# Patient Record
Sex: MALE | Race: WHITE | NOT HISPANIC OR LATINO | URBAN - METROPOLITAN AREA
[De-identification: names, ages, dates, MRNs, and addresses within clinical notes are randomized per-mention and may not be internally consistent; named-entity substitution may affect disease eponyms.]

---

## 2020-02-07 ENCOUNTER — ON CAMPUS - OUTPATIENT (AMBULATORY)
Dept: URBAN - METROPOLITAN AREA HOSPITAL 2 | Facility: HOSPITAL | Age: 65
End: 2020-02-07
Payer: COMMERCIAL

## 2020-02-07 VITALS
SYSTOLIC BLOOD PRESSURE: 126 MMHG | DIASTOLIC BLOOD PRESSURE: 86 MMHG | OXYGEN SATURATION: 99 % | SYSTOLIC BLOOD PRESSURE: 137 MMHG | HEART RATE: 72 BPM | DIASTOLIC BLOOD PRESSURE: 80 MMHG | TEMPERATURE: 98.6 F | DIASTOLIC BLOOD PRESSURE: 81 MMHG | DIASTOLIC BLOOD PRESSURE: 99 MMHG | HEART RATE: 78 BPM | DIASTOLIC BLOOD PRESSURE: 77 MMHG | DIASTOLIC BLOOD PRESSURE: 94 MMHG | DIASTOLIC BLOOD PRESSURE: 85 MMHG | SYSTOLIC BLOOD PRESSURE: 140 MMHG | SYSTOLIC BLOOD PRESSURE: 149 MMHG | SYSTOLIC BLOOD PRESSURE: 135 MMHG | HEIGHT: 73 IN | SYSTOLIC BLOOD PRESSURE: 136 MMHG | RESPIRATION RATE: 16 BRPM | OXYGEN SATURATION: 96 % | OXYGEN SATURATION: 95 % | HEART RATE: 82 BPM | OXYGEN SATURATION: 100 % | HEART RATE: 79 BPM | RESPIRATION RATE: 18 BRPM | SYSTOLIC BLOOD PRESSURE: 130 MMHG | HEART RATE: 73 BPM | HEART RATE: 75 BPM | OXYGEN SATURATION: 97 % | DIASTOLIC BLOOD PRESSURE: 83 MMHG | WEIGHT: 238 LBS | SYSTOLIC BLOOD PRESSURE: 147 MMHG

## 2020-02-07 DIAGNOSIS — K64.1 SECOND DEGREE HEMORRHOIDS: ICD-10-CM

## 2020-02-07 DIAGNOSIS — Z12.11 ENCOUNTER FOR SCREENING FOR MALIGNANT NEOPLASM OF COLON: ICD-10-CM

## 2020-02-07 PROCEDURE — 45378 DIAGNOSTIC COLONOSCOPY: CPT | Mod: 33 | Performed by: INTERNAL MEDICINE

## 2021-07-21 ENCOUNTER — TELEPHONE (OUTPATIENT)
Dept: CARDIOLOGY | Facility: CLINIC | Age: 66
End: 2021-07-21

## 2021-07-21 PROBLEM — R55 SYNCOPE AND COLLAPSE: Status: ACTIVE | Noted: 2021-07-21

## 2021-07-21 RX ORDER — ESCITALOPRAM OXALATE 10 MG/1
10 TABLET ORAL DAILY
COMMUNITY

## 2021-07-21 RX ORDER — ROSUVASTATIN CALCIUM 10 MG/1
10 TABLET, COATED ORAL DAILY
COMMUNITY

## 2021-07-21 RX ORDER — HYDROCODONE BITARTRATE AND ACETAMINOPHEN 10; 325 MG/1; MG/1
1 TABLET ORAL EVERY 6 HOURS PRN
COMMUNITY

## 2021-07-21 RX ORDER — CELECOXIB 100 MG/1
100 CAPSULE ORAL EVERY OTHER DAY
COMMUNITY

## 2021-07-21 RX ORDER — LOSARTAN POTASSIUM 100 MG/1
100 TABLET ORAL DAILY
COMMUNITY

## 2021-07-21 NOTE — PROGRESS NOTES
Reason for consultation: Syncope     Referring physician: self    HPI. Caleb Moreno is a 65 year old male who presents to the office with concerns of dizziness and syncope. His wife reports he has had these episodes for many years and previous work up has found no cause. Because his wife had a pacemaker placed recently she is concerned about his heart and thought he needed to be re-evaluated.  He reports it happens about every 10 years or so and he has usually related to being dehydrated. He admits to not drinking water very often. He states the next day he is tired but denies any brain fog or seizure type activity. Previous work up was a stress test and holter monitor 10 years or so ago, which came back normal. He does have high blood pressure, and has had a sleep study in the past that was borderline for sleep apnea by his report. He denies history of murmur, CAD, congestive heart failure, valvular heart disease, history of atrial fibrillation or other dysrhythmias.     Mr. Moreno describes himself is very functionally active.  He enjoys all sorts of outdoor projects and works out a couple of times per week and works 40+ hours per week at his regular job, which involves a fair amount of walking.    His brother a triple bypass 3 months ago, and his sister had a double bypass 2 months ago.    Current medication list was reviewed in detail today.     Past Medical History:   Diagnosis Date   • Back pain    • Depression    • History of meningitis    • HLD (hyperlipidemia)    • HTN (hypertension)    • Swelling of both lower extremities    • Syncope    • Vertigo        Past Surgical History:   Procedure Laterality Date   • KNEE SURGERY         Family History   Problem Relation Age of Onset   • Cancer Mother    • Heart disease Father    • Hypertension Brother        Social History     Socioeconomic History   • Marital status:      Spouse name: Not on file   • Number of children: Not on file   • Years of education: Not  "on file   • Highest education level: Not on file   Tobacco Use   • Smoking status: Former Smoker     Quit date:      Years since quittin.5   • Smokeless tobacco: Never Used   Vaping Use   • Vaping Use: Never used   Substance and Sexual Activity   • Alcohol use: Not Currently   • Drug use: Yes     Frequency: 1.0 times per week     Types: Marijuana   • Sexual activity: Defer         Review of Systems   General: denies fever, chills, anorexia, weight loss  Eyes: denies blurring, diplopia  Ear/Nose/Throat: denies ear pain, nosebleeds, hoarseness  Cardiovascular: See HPI  Respiratory: denies excessive sputum, hemoptysis, wheezing  Gastrointestinal: denies nausea, vomiting, change in bowel habits, abdominal pain  Genitourinary: denies dysuria and hematuria, \"does have erectile dysfunction  Musculoskeletal: denies back pain, joint pain, joint swelling, muscle cramps, weakness  Skin: denies rashes, itching, suspicious lesions  Neurologic: denies focal neuro deficits  Psychiatric: denies depression, anxiety  Endocrine: denies cold intolerance, heat intolerance  Hematologic/Lymphatic: denies abnormal bruising, bleeding  Allergic/Immunologic: denies urticaria or persistent infections    Allergies: Patient has no known allergies.    Current Meds:.  Current Outpatient Medications   Medication Sig Dispense Refill   • celecoxib (CeleBREX) 100 MG capsule Take 100 mg by mouth Every Other Day.     • escitalopram (LEXAPRO) 10 MG tablet Take 10 mg by mouth Daily.     • HYDROcodone-acetaminophen (NORCO)  MG per tablet Take 1 tablet by mouth Every 6 (Six) Hours As Needed.     • losartan (COZAAR) 100 MG tablet Take 100 mg by mouth Daily.     • rosuvastatin (CRESTOR) 10 MG tablet Take 10 mg by mouth Daily.     • vitamin D3 125 MCG (5000 UT) capsule capsule Take 5,000 Units by mouth Daily.     • amLODIPine (NORVASC) 5 MG tablet Take 1 tablet by mouth Daily. 30 tablet 3   • tadalafil (Cialis) 20 MG tablet Take 1 tablet by " "mouth Daily As Needed for Erectile Dysfunction (As needed for erectile dysfunction). 10 tablet 1     No current facility-administered medications for this visit.       Objective     VITAL SIGNS  /80   Pulse 56   Ht 185.4 cm (73\")   Wt 104 kg (230 lb)   SpO2 98%   BMI 30.34 kg/m²      Physical Exam:  General:    Borderline  Obese, well developed,, in no acute distress.    Head:     normocephalic and atraumatic.    Eyes:    PERRL/EOM intact, conjunctiva and sclera clear with out nystagmus.    Neck:    no jvd or bruits  Chest Wall:    no deformities   Lungs:    clear bilaterally to auscultation with adequate global airflow   Heart:    non-displaced PMI; regular mildly bradycardic rhythm, normal S1, S2 without murmurs, rubs, or gallops  Abdomen:  Soft, nontender without HSM  Msk:    no deformity; adequate R OM  Pulses:    pulses normal in all 4 extremities.    Extremities:    no clubbing, cyanosis, edema   Neurologic:    no focal sensory or motor deficits  Skin:    intact without lesions or rashes.    Psych:    alert and cooperative; normal mood and affect; normal attention span and concentration.             Results Review:      ECG 12 Lead    Date/Time: 7/23/2021 12:06 PM  Performed by: KASSIE Uribe MD  Authorized by: KASSIE Uribe MD   Comparison: not compared with previous ECG   Previous ECG: no previous ECG available  Rhythm: sinus bradycardia    Clinical impression: abnormal EKG and non-specific ECG  Comments: Bradycardic sinus rhythm with rate 56 otherwise normal EKG with no comparison tracing available            ECHOCARDIOGRAM: No previous    STRESS MYOVIEW: Reports negative stress treadmill exam 10 years or so ago; will reschedule    CARDIAC CATHETERIZATION: No prior    OTHER:     Imaging Results (Last 24 Hours)     ** No results found for the last 24 hours. **          Assessment/Plan     1. Syncope and collapse  -Rare isolated event with no history of dysrhythmia aside from relative " bradycardia  - Treadmill Stress Test; Future  -Outpatient echocardiogram will be scheduled    2.  Asymptomatic sinus bradycardia  -We will consider Holter monitor of 30-day event monitor patient is very clear he has no symptoms associated with his relative bradycardia in the 40s and 50s much of the time    3.  Hyperlipidemia-maintained on Crestor    4.  Essential hypertension-inadequately regulated on losartan 100 mg daily  -We will add amlodipine 5 mg p.o. daily and have patient keep home BP/HR log for 6 to 8 weeks    5.  Erectile dysfunction  -We will prescribe trial of Cialis 20 mg 1/2-1 p.o. daily as needed        Stress treadmill exam will be completed in the office today.  We will try to get his outpatient echocardiogram arranged for later today since he is driving here from [facility].  Initiate amlodipine 5 mg p.o. daily and continue on losartan.  I will also give him a trial of Cialis 20 mg 1/2 to 1 tablet p.o. daily as needed erectile dysfunction.  Return to clinic 6 weeks or sooner if needed.        KASSIE Uribe MD  7/26/2021 17:58 EDT    This report was generated using the Dragon voice recognition system.

## 2021-07-21 NOTE — TELEPHONE ENCOUNTER
MADHURI Anglin to call us back with his PCP or referring MD and if there was any testing done. No records on the chart

## 2021-07-23 ENCOUNTER — OFFICE VISIT (OUTPATIENT)
Dept: CARDIOLOGY | Facility: CLINIC | Age: 66
End: 2021-07-23

## 2021-07-23 VITALS
WEIGHT: 230 LBS | SYSTOLIC BLOOD PRESSURE: 174 MMHG | DIASTOLIC BLOOD PRESSURE: 80 MMHG | HEIGHT: 73 IN | BODY MASS INDEX: 30.48 KG/M2 | OXYGEN SATURATION: 98 % | HEART RATE: 56 BPM

## 2021-07-23 DIAGNOSIS — R55 SYNCOPE AND COLLAPSE: Primary | ICD-10-CM

## 2021-07-23 PROCEDURE — 93000 ELECTROCARDIOGRAM COMPLETE: CPT | Performed by: INTERNAL MEDICINE

## 2021-07-23 PROCEDURE — 99204 OFFICE O/P NEW MOD 45 MIN: CPT | Performed by: INTERNAL MEDICINE

## 2021-07-26 ENCOUNTER — TELEPHONE (OUTPATIENT)
Dept: CARDIOLOGY | Facility: CLINIC | Age: 66
End: 2021-07-26

## 2021-07-26 RX ORDER — TADALAFIL 20 MG/1
20 TABLET ORAL DAILY PRN
Qty: 10 TABLET | Refills: 1 | Status: SHIPPED | OUTPATIENT
Start: 2021-07-26

## 2021-07-26 RX ORDER — AMLODIPINE BESYLATE 5 MG/1
5 TABLET ORAL DAILY
Qty: 30 TABLET | Refills: 3 | Status: SHIPPED | OUTPATIENT
Start: 2021-07-26

## 2021-07-26 NOTE — TELEPHONE ENCOUNTER
Patient was supposed to have a new medication called in and [facility] in [place] never received it. She said that she wasn't sure what the medication was.

## 2021-09-07 ENCOUNTER — OFFICE VISIT (OUTPATIENT)
Dept: CARDIOLOGY | Facility: CLINIC | Age: 66
End: 2021-09-07

## 2021-09-07 VITALS
OXYGEN SATURATION: 97 % | DIASTOLIC BLOOD PRESSURE: 68 MMHG | SYSTOLIC BLOOD PRESSURE: 140 MMHG | BODY MASS INDEX: 31.01 KG/M2 | HEIGHT: 73 IN | HEART RATE: 57 BPM | WEIGHT: 234 LBS

## 2021-09-07 DIAGNOSIS — I10 ESSENTIAL HYPERTENSION: ICD-10-CM

## 2021-09-07 DIAGNOSIS — R55 SYNCOPE AND COLLAPSE: Primary | ICD-10-CM

## 2021-09-07 DIAGNOSIS — E78.5 DYSLIPIDEMIA: ICD-10-CM

## 2021-09-07 PROCEDURE — 93000 ELECTROCARDIOGRAM COMPLETE: CPT | Performed by: NURSE PRACTITIONER

## 2021-09-07 PROCEDURE — 99213 OFFICE O/P EST LOW 20 MIN: CPT | Performed by: NURSE PRACTITIONER

## 2021-09-08 PROBLEM — E78.5 DYSLIPIDEMIA: Status: ACTIVE | Noted: 2021-09-08

## 2021-09-08 PROBLEM — I10 ESSENTIAL HYPERTENSION: Status: ACTIVE | Noted: 2021-09-08

## 2021-09-08 NOTE — PROGRESS NOTES
Cardiology Office Follow Up Visit      Primary Care Provider:  Karl Neumann MD    Reason for f/u:     History of syncope  Hypertension  Dyslipidemia      Subjective     CC:    Denies chest pain, dyspnea, syncope    History of Present Illness       Caleb Moreno is a 66 y.o. male.  Patient is here today for follow-up.  He was recently evaluated by Dr. Uribe back in July with reports of history of syncope.  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  He was thought not to be staying well-hydrated or drinking enough fluids which may have resulted in these events.    The patient reports a previous ischemic evaluation but had been many years back.  He has a history of hypertension and antihypertensive agents were titrated during his last visit.    The patient remains very functionally active.  He works outdoors quite a bit and denies any chest pain, undue dyspnea, PND, orthopnea or palpitations.    Since his last visit he has made a concerted effort to try to stay well-hydrated and he has had no episodes of dizziness or recurrent syncope.    He reports compliance with medical therapy.        Past Medical History:   Diagnosis Date   • Back pain    • Depression    • History of meningitis    • HLD (hyperlipidemia)    • HTN (hypertension)    • Swelling of both lower extremities    • Syncope    • Vertigo        Past Surgical History:   Procedure Laterality Date   • KNEE SURGERY           Current Outpatient Medications:   •  amLODIPine (NORVASC) 5 MG tablet, Take 1 tablet by mouth Daily., Disp: 30 tablet, Rfl: 3  •  celecoxib (CeleBREX) 100 MG capsule, Take 100 mg by mouth Every Other Day., Disp: , Rfl:   •  escitalopram (LEXAPRO) 10 MG tablet, Take 10 mg by mouth Daily., Disp: , Rfl:   •  HYDROcodone-acetaminophen (NORCO)  MG per tablet, Take 1 tablet by mouth Every 6 (Six) Hours As Needed., Disp: , Rfl:   •  losartan (COZAAR) 100 MG tablet, Take 100 mg by mouth Daily., Disp: , Rfl:    •  rosuvastatin (CRESTOR) 10 MG tablet, Take 10 mg by mouth Daily., Disp: , Rfl:   •  tadalafil (Cialis) 20 MG tablet, Take 1 tablet by mouth Daily As Needed for Erectile Dysfunction (As needed for erectile dysfunction)., Disp: 10 tablet, Rfl: 1  •  vitamin D3 125 MCG (5000 UT) capsule capsule, Take 5,000 Units by mouth Daily., Disp: , Rfl:     Social History     Socioeconomic History   • Marital status:      Spouse name: Not on file   • Number of children: Not on file   • Years of education: Not on file   • Highest education level: Not on file   Tobacco Use   • Smoking status: Former Smoker     Quit date:      Years since quittin.7   • Smokeless tobacco: Never Used   Vaping Use   • Vaping Use: Never used   Substance and Sexual Activity   • Alcohol use: Not Currently   • Drug use: Yes     Frequency: 1.0 times per week     Types: Marijuana   • Sexual activity: Defer       Family History   Problem Relation Age of Onset   • Cancer Mother    • Heart disease Father    • Hypertension Brother        The following portions of the patient's history were reviewed and updated as appropriate: allergies, current medications, past family history, past medical history, past social history, past surgical history and problem list.    Review of Systems   Constitutional: Negative for decreased appetite and diaphoresis.   HENT: Negative for congestion, hearing loss and nosebleeds.    Cardiovascular: Negative for chest pain, claudication, dyspnea on exertion, irregular heartbeat, leg swelling, near-syncope, orthopnea, palpitations, paroxysmal nocturnal dyspnea and syncope.   Respiratory: Negative for cough, shortness of breath and sleep disturbances due to breathing.    Endocrine: Negative for polyuria.   Hematologic/Lymphatic: Does not bruise/bleed easily.   Skin: Negative for itching and rash.   Musculoskeletal: Negative for back pain, muscle weakness and myalgias.   Gastrointestinal: Negative for abdominal pain,  "change in bowel habit and nausea.   Genitourinary: Negative for dysuria, flank pain, frequency and hesitancy.   Neurological: Negative for dizziness, tremors and weakness.   Psychiatric/Behavioral: Negative for altered mental status. The patient does not have insomnia.      /68   Pulse 57   Ht 185.4 cm (73\")   Wt 106 kg (234 lb)   SpO2 97%   BMI 30.87 kg/m² .  Objective     Vitals reviewed.   Constitutional:       General: Not in acute distress.     Appearance: Normal appearance. Well-developed and not in distress.   Eyes:      Pupils: Pupils are equal, round, and reactive to light.   HENT:      Head: Normocephalic and atraumatic.   Neck:      Vascular: No JVD.   Pulmonary:      Effort: Pulmonary effort is normal.      Breath sounds: Normal breath sounds.   Cardiovascular:      Normal rate. Regular rhythm.   Pulses:     Intact distal pulses.   Edema:     Peripheral edema absent.   Abdominal:      General: There is no distension.      Palpations: Abdomen is soft.      Tenderness: There is no abdominal tenderness.   Musculoskeletal: Normal range of motion.      Cervical back: Normal range of motion and neck supple. Skin:     General: Skin is warm and dry.   Neurological:      Mental Status: Alert, oriented to person, place, and time and oriented to person, place and time.             ECG 12 Lead    Date/Time: 9/7/2021 3:09 PM  Performed by: Yesika Mayer APRN  Authorized by: Yesika Mayer APRN   Comparison: not compared with previous ECG   Rhythm: sinus bradycardia  BPM: 57    Clinical impression: non-specific ECG            EKG ordered by and reviewed by me in office         Diagnoses and all orders for this visit:    1. Syncope and collapse (Primary)  Comments:  Rare previous episodes of syncope with none recently.  None since last office visit    2. Essential hypertension  Comments:  Recent adjustments in antihypertensive agents by Dr. Uribe with stable blood pressure at this time    3. " Dyslipidemia  Comments:  Remains on statin therapy           MEDICAL DECISION MAKING:    With Dr. Michael Larios on current medical therapyPatient is here to the office for follow-up after recent titration of antihypertensive agents by Dr. Uribe.  His blood pressure is better controlled.  He is making concerted efforts to stay well-hydrated.  He has had no recurrent dizziness or any syncopal episodes for some time.      He was recently started amlodipine which she reports compliance with.    I made no changes in his medicine we will continue the current treatment patient's been advised to contact the office sooner if he should develop any new or worsening problems otherwise we will plan on seeing him back for scheduled follow-up in 1 year.

## 2024-05-09 ENCOUNTER — TELEPHONE (OUTPATIENT)
Dept: CARDIOLOGY | Facility: CLINIC | Age: 69
End: 2024-05-09
Payer: COMMERCIAL

## 2024-06-21 ENCOUNTER — OFFICE VISIT (OUTPATIENT)
Dept: CARDIOLOGY | Facility: CLINIC | Age: 69
End: 2024-06-21
Payer: MEDICARE

## 2024-06-21 VITALS
HEIGHT: 73 IN | DIASTOLIC BLOOD PRESSURE: 69 MMHG | SYSTOLIC BLOOD PRESSURE: 126 MMHG | WEIGHT: 220 LBS | BODY MASS INDEX: 29.16 KG/M2 | HEART RATE: 55 BPM | RESPIRATION RATE: 18 BRPM

## 2024-06-21 DIAGNOSIS — R06.09 DOE (DYSPNEA ON EXERTION): ICD-10-CM

## 2024-06-21 DIAGNOSIS — R93.1 ELEVATED CORONARY ARTERY CALCIUM SCORE: Primary | ICD-10-CM

## 2024-06-21 RX ORDER — ROSUVASTATIN CALCIUM 20 MG/1
20 TABLET, COATED ORAL DAILY
Qty: 90 TABLET | Refills: 3 | Status: SHIPPED | OUTPATIENT
Start: 2024-06-21

## 2024-06-21 RX ORDER — LOSARTAN POTASSIUM AND HYDROCHLOROTHIAZIDE 25; 100 MG/1; MG/1
1 TABLET ORAL DAILY
COMMUNITY
Start: 2024-06-20 | End: 2024-12-17

## 2024-06-21 RX ORDER — ESCITALOPRAM OXALATE 20 MG/1
20 TABLET ORAL DAILY
COMMUNITY
Start: 2024-06-20 | End: 2025-06-20

## 2024-06-21 RX ORDER — ASPIRIN 81 MG/1
81 TABLET ORAL DAILY
Qty: 90 TABLET | Refills: 3 | Status: SHIPPED | OUTPATIENT
Start: 2024-06-21

## 2024-06-21 RX ORDER — FERROUS SULFATE 325(65) MG
1 TABLET ORAL DAILY
COMMUNITY
Start: 2024-05-17

## 2024-07-01 NOTE — PROGRESS NOTES
"Cardiology Clinic Note  Addi Rodriguez MD, PhD    Subjective:     Encounter Date:06/21/2024      Patient ID: Caleb Moreno is a 68 y.o. male.    Chief Complaint:  Chief Complaint   Patient presents with    Coronary Artery Disease       HPI:    I the pleasure to see this 68-year-old in clinic as a new patient with abnormal calcium score.  Blood pressure is well-controlled, has had 15 pounds of intentional weight loss recently.  He is controlled on low-dose statin therapy, ARB HCTZ and amlodipine.  He denies any unstable anginal chest pain.  He has a history of syncope thought secondary to prior dehydration working outside as well as being on antihypertensives including HCTZ.  He had a screening calcium score which revealed left main to 32, LAD 68 circumflex 41,  with total calcium score 504 which is high risk for CV events.  We discussed functional testing for evaluation.  He does have some mild dyspnea on exertion    Review of systems otherwise negative x 14 point review of systems except as mentioned above    Historical data copied forward from previous encounters in EMR including the history, exam, and assessment/plan has been reviewed and is unchanged unless noted otherwise.    Cardiac medicines reviewed with risk, benefits, and necessity of each discussed.    Risk and benefit of cardiac testing reviewed including death heart attack stroke pain bleeding infection need for vascular /cardiovascular surgery were discussed and the patient     Objective:         /69 (BP Location: Right arm, Patient Position: Sitting)   Pulse 55   Resp 18   Ht 185.4 cm (73\")   Wt 99.8 kg (220 lb)   BMI 29.03 kg/m²     Physical Exam  Regular rate and rhythm no rubs gallops heave or lift  No clubbing cyanosis or edema  Skin is warm and dry  Normal pulses normal cap refill  No carotid bruits or JVD  Assessment:       Independently manage medical conditions today  Secondary prevention CAD  Coronary artery " disease  Elevated calcium score high risk  Dyspnea on exertion  Essential hypertension  Sinus bradycardia  Diagnoses and all orders for this visit:    1. Elevated coronary artery calcium score (Primary)  -     Stress Test With Myocardial Perfusion One Day; Future    2. SYLVESTER (dyspnea on exertion)  -     Stress Test With Myocardial Perfusion One Day; Future    Other orders  -     aspirin 81 MG EC tablet; Take 1 tablet by mouth Daily.  Dispense: 90 tablet; Refill: 3  -     rosuvastatin (CRESTOR) 20 MG tablet; Take 1 tablet by mouth Daily.  Dispense: 90 tablet; Refill: 3    Increase statin, Crestor 20 daily, combine aspirin with high risk calcium score  Diet and exercise brachia guidelines  Functional testing with treadmill stress or Lexiscan depending on functional status and ability to perform the test  Kaylie blockers recommended with heart rates in the mid 50s  EKG without ischemic findings today  Afterload reduction is okay with calcium channel blocker losartan HCTZ  Avoid dehydration  No clinical heart failure on exam today    Further recommendation follow findings and clinical course              The pleasure to be involved in this patient's cardiovascular care.  Please call with any questions or concerns  Addi Rodriguez MD, PhD    Most recent EKG as reviewed and interpreted by me:    ECG 12 Lead    Date/Time: 6/21/2024 2:56 PM  Performed by: Addi Rodriguez MD    Authorized by: Addi Rodriguez MD  Comparison: not compared with previous ECG   Previous ECG: no previous ECG available  Rhythm: sinus bradycardia  Conduction: conduction normal  QRS axis: normal    Clinical impression: non-specific ECG           Most recent echo as reviewed and interpreted by me:      Most recent stress test as reviewed and interpreted by me:  Results for orders placed in visit on 07/23/21    Treadmill Stress Test    Interpretation Summary  · Negative stress treadmill exam for exercise-induced ectopy or ischemia at  satisfactory peak work level with 100% target heart rate achieved in 8.3 METS workload attained.  · Marginal but adequate exercise tolerance for age with physiologic blood pressure and heart rate response to exercise      Most recent cardiac catheterization as reviewed interpreted by me:  No results found for this or any previous visit.    The following portions of the patient's history were reviewed and updated as appropriate: allergies, current medications, past family history, past medical history, past social history, past surgical history, and problem list.      ROS:  14 point review of systems negative except as mentioned above    Current Outpatient Medications:     amLODIPine (NORVASC) 5 MG tablet, Take 1 tablet by mouth Daily., Disp: 30 tablet, Rfl: 3    celecoxib (CeleBREX) 100 MG capsule, Take 1 capsule by mouth Every Other Day., Disp: , Rfl:     escitalopram (LEXAPRO) 20 MG tablet, Take 1 tablet by mouth Daily., Disp: , Rfl:     ferrous sulfate (FeroSul) 325 (65 FE) MG tablet, Take 1 tablet by mouth Daily., Disp: , Rfl:     HYDROcodone-acetaminophen (NORCO)  MG per tablet, Take 1 tablet by mouth Every 6 (Six) Hours As Needed., Disp: , Rfl:     losartan-hydrochlorothiazide (HYZAAR) 100-25 MG per tablet, Take 1 tablet by mouth Daily., Disp: , Rfl:     vitamin D3 125 MCG (5000 UT) capsule capsule, Take 1 capsule by mouth Daily., Disp: , Rfl:     aspirin 81 MG EC tablet, Take 1 tablet by mouth Daily., Disp: 90 tablet, Rfl: 3    rosuvastatin (CRESTOR) 20 MG tablet, Take 1 tablet by mouth Daily., Disp: 90 tablet, Rfl: 3    Problem List:  Patient Active Problem List   Diagnosis    Syncope and collapse    Essential hypertension    Dyslipidemia     Past Medical History:  Past Medical History:   Diagnosis Date    Back pain     Depression     History of meningitis     HLD (hyperlipidemia)     HTN (hypertension)     Swelling of both lower extremities     Syncope     Vertigo      Past Surgical History:  Past  Surgical History:   Procedure Laterality Date    KNEE SURGERY       Social History:  Social History     Socioeconomic History    Marital status:    Tobacco Use    Smoking status: Former     Current packs/day: 0.00     Types: Cigarettes     Quit date:      Years since quittin.5    Smokeless tobacco: Current     Types: Snuff   Vaping Use    Vaping status: Never Used   Substance and Sexual Activity    Alcohol use: Not Currently    Drug use: Yes     Frequency: 1.0 times per week     Types: Marijuana    Sexual activity: Defer     Allergies:  No Known Allergies  Immunizations:  Immunization History   Administered Date(s) Administered    COVID-19 (PFIZER) BIVALENT 12+YRS 2023    COVID-19 (PFIZER) Purple Cap Monovalent 2021, 2021, 2021    COVID-19 F23 (PFIZER) 12YRS+ (COMIRNATY) 10/17/2023            In-Office Procedure(s):  No orders to display        ASCVD RIsk Score::  The ASCVD Risk score (Neri NEGRETE, et al., 2019) failed to calculate.    Imaging:    Results for orders placed in visit on 24    IMAGING SCANNED               Lab Review:   No visits with results within 6 Month(s) from this visit.   Latest known visit with results is:   Ancillary Procedure on 2021   Component Date Value    Target HR (85%) 2021 133     Max. Pred. HR (100%) 2021 157     Exercise duration (sec) 2021 50     Exercise duration (min) 2021 6     Estimated workload 2021 8.3      Recent labs reviewed and interpreted for clinical significance and application            Level of Care:           Addi Rodriguez MD  24  .

## 2024-07-02 ENCOUNTER — HOSPITAL ENCOUNTER (OUTPATIENT)
Dept: NUCLEAR MEDICINE | Facility: HOSPITAL | Age: 69
Discharge: HOME | End: 2024-07-02
Payer: MEDICARE

## 2024-07-02 DIAGNOSIS — R93.1 ELEVATED CORONARY ARTERY CALCIUM SCORE: ICD-10-CM

## 2024-07-02 DIAGNOSIS — R06.09 DOE (DYSPNEA ON EXERTION): ICD-10-CM

## 2024-07-02 LAB
BH CV NUCLEAR PRIOR STUDY: 3
BH CV REST NUCLEAR ISOTOPE DOSE: 11 MCI
BH CV STRESS BP STAGE 1: NORMAL
BH CV STRESS BP STAGE 2: NORMAL
BH CV STRESS BP STAGE 3: NORMAL
BH CV STRESS COMMENTS STAGE 1: NORMAL
BH CV STRESS COMMENTS STAGE 2: NORMAL
BH CV STRESS DOSE REGADENOSON STAGE 1: 0.4
BH CV STRESS DURATION MIN STAGE 1: 0
BH CV STRESS DURATION MIN STAGE 2: 4
BH CV STRESS DURATION SEC STAGE 1: 10
BH CV STRESS DURATION SEC STAGE 2: 0
BH CV STRESS HR STAGE 1: 90
BH CV STRESS HR STAGE 2: 97
BH CV STRESS HR STAGE 3: 91
BH CV STRESS NUCLEAR ISOTOPE DOSE: 33 MCI
BH CV STRESS PROTOCOL 1: NORMAL
BH CV STRESS RECOVERY BP: NORMAL MMHG
BH CV STRESS RECOVERY HR: 93 BPM
BH CV STRESS STAGE 1: 1
BH CV STRESS STAGE 2: 2
BH CV STRESS STAGE 3: 3
LV EF NUC BP: 60 %
MAXIMAL PREDICTED HEART RATE: 154 BPM
PERCENT MAX PREDICTED HR: 62.99 %
STRESS BASELINE BP: NORMAL MMHG
STRESS BASELINE HR: 75 BPM
STRESS PERCENT HR: 74 %
STRESS POST PEAK BP: NORMAL MMHG
STRESS POST PEAK HR: 97 BPM
STRESS TARGET HR: 131 BPM

## 2024-07-02 PROCEDURE — 78452 HT MUSCLE IMAGE SPECT MULT: CPT

## 2024-07-02 PROCEDURE — 0 TECHNETIUM TETROFOSMIN KIT: Performed by: INTERNAL MEDICINE

## 2024-07-02 PROCEDURE — 93017 CV STRESS TEST TRACING ONLY: CPT

## 2024-07-02 PROCEDURE — 25010000002 REGADENOSON 0.4 MG/5ML SOLUTION: Performed by: INTERNAL MEDICINE

## 2024-07-02 PROCEDURE — A9502 TC99M TETROFOSMIN: HCPCS | Performed by: INTERNAL MEDICINE

## 2024-07-02 RX ORDER — REGADENOSON 0.08 MG/ML
0.4 INJECTION, SOLUTION INTRAVENOUS
Status: COMPLETED | OUTPATIENT
Start: 2024-07-02 | End: 2024-07-02

## 2024-07-02 RX ADMIN — REGADENOSON 0.4 MG: 0.08 INJECTION, SOLUTION INTRAVENOUS at 09:44

## 2024-07-02 RX ADMIN — TETROFOSMIN 1 DOSE: 1.38 INJECTION, POWDER, LYOPHILIZED, FOR SOLUTION INTRAVENOUS at 09:00

## 2024-07-02 RX ADMIN — TETROFOSMIN 1 DOSE: 1.38 INJECTION, POWDER, LYOPHILIZED, FOR SOLUTION INTRAVENOUS at 09:44

## 2024-07-05 ENCOUNTER — TELEPHONE (OUTPATIENT)
Dept: CARDIOLOGY | Facility: CLINIC | Age: 69
End: 2024-07-05
Payer: MEDICARE

## 2024-07-05 NOTE — TELEPHONE ENCOUNTER
Caller: DAVID    Relationship: Other    What form or medical record are you requesting: LAST EKG TRACING    Who is requesting this form or medical record from you: [facility]    How would you like to receive the form or medical records (pick-up, mail, fax): FAX  If fax, what is the fax number: [phone]      Timeframe paperwork needed: ASAP    Additional notes:

## 2024-07-12 ENCOUNTER — LAB REQUISITION (OUTPATIENT)
Dept: LAB | Facility: HOSPITAL | Age: 69
End: 2024-07-12
Payer: MEDICARE

## 2024-07-12 DIAGNOSIS — N52.9 MALE ERECTILE DYSFUNCTION, UNSPECIFIED: ICD-10-CM

## 2024-07-12 LAB
BASOPHILS # BLD AUTO: 0.06 10*3/MM3 (ref 0–0.2)
BASOPHILS NFR BLD AUTO: 0.6 % (ref 0–1.5)
DEPRECATED RDW RBC AUTO: 51.8 FL (ref 37–54)
EOSINOPHIL # BLD AUTO: 0.25 10*3/MM3 (ref 0–0.4)
EOSINOPHIL NFR BLD AUTO: 2.6 % (ref 0.3–6.2)
ERYTHROCYTE [DISTWIDTH] IN BLOOD BY AUTOMATED COUNT: 14.5 % (ref 12.3–15.4)
HCT VFR BLD AUTO: 30 % (ref 37.5–51)
HGB BLD-MCNC: 9.7 G/DL (ref 13–17.7)
IMM GRANULOCYTES # BLD AUTO: 0.08 10*3/MM3 (ref 0–0.05)
IMM GRANULOCYTES NFR BLD AUTO: 0.8 % (ref 0–0.5)
LYMPHOCYTES # BLD AUTO: 1.52 10*3/MM3 (ref 0.7–3.1)
LYMPHOCYTES NFR BLD AUTO: 15.7 % (ref 19.6–45.3)
MCH RBC QN AUTO: 31.7 PG (ref 26.6–33)
MCHC RBC AUTO-ENTMCNC: 32.3 G/DL (ref 31.5–35.7)
MCV RBC AUTO: 98 FL (ref 79–97)
MONOCYTES # BLD AUTO: 0.67 10*3/MM3 (ref 0.1–0.9)
MONOCYTES NFR BLD AUTO: 6.9 % (ref 5–12)
NEUTROPHILS NFR BLD AUTO: 7.12 10*3/MM3 (ref 1.7–7)
NEUTROPHILS NFR BLD AUTO: 73.4 % (ref 42.7–76)
NRBC BLD AUTO-RTO: 0 /100 WBC (ref 0–0.2)
PLATELET # BLD AUTO: 133 10*3/MM3 (ref 140–450)
PMV BLD AUTO: 8.7 FL (ref 6–12)
RBC # BLD AUTO: 3.06 10*6/MM3 (ref 4.14–5.8)
WBC NRBC COR # BLD AUTO: 9.7 10*3/MM3 (ref 3.4–10.8)

## 2024-07-12 PROCEDURE — 85025 COMPLETE CBC W/AUTO DIFF WBC: CPT | Performed by: UROLOGY

## 2024-07-15 ENCOUNTER — TELEPHONE (OUTPATIENT)
Dept: CARDIOLOGY | Facility: CLINIC | Age: 69
End: 2024-07-15

## 2024-07-15 NOTE — TELEPHONE ENCOUNTER
Caller: Caleb Moreno    Relationship: Self    Best call back number: [phone]     Caller requesting test results: PATIENT    What test was performed: STRESS TEST    When was the test performed: 07.02.24    Where was the test performed: [facility]    Additional notes:  PT REACHING OUT TO SEE ABOUT RESULTS OF STRESS TEST - HE STATES HE HADNT HEARD FROM OFFICE BUT PT DID HAVE HIS SURGERY RECENTLY AND WAS FOLLOWING UP -

## 2024-07-16 NOTE — TELEPHONE ENCOUNTER
Name: Caleb Moreno    Relationship: Self    Best Callback Number: [phone]    HUB PROVIDED THE RELAY MESSAGE FROM THE OFFICE   PATIENT VOICED UNDERSTANDING AND HAS NO FURTHER QUESTIONS AT THIS TIME

## 2024-09-30 ENCOUNTER — TELEPHONE (OUTPATIENT)
Dept: CARDIOLOGY | Facility: CLINIC | Age: 69
End: 2024-09-30

## 2024-09-30 NOTE — TELEPHONE ENCOUNTER
Caller: EITAN RAMAKRISHNA CUI    Best call back number: [phone]    What form or medical record are you requesting: CARDIAC CLEARANCE    Who is requesting this form or medical record from you: RAMAKRISHNA CUI    How would you like to receive the form or medical records (pick-up, mail, fax): FAX  If fax, what is tFhe fax number: [phone]    Timeframe paperwork needed: ASAP    Additional notes: EITAN FOLLOWING UP TO SEE IF CLEARANCE WAS RECEIVED, CLEARANCE SHOWING IN MEDIA TAB AND IS SIGNED ON 09.17.24, SHE IS ASKING FOR IT TO BE FAXED TO ABOVE NUMBERS - IF POSSIBLE TO SEND TO BOTH AS A BACKUP

## 2025-02-17 PROBLEM — Z12.11 SCREENING FOR MALIGNANT NEOPLASMS OF COLON: Status: ACTIVE | Noted: 2020-02-07

## 2025-03-06 ENCOUNTER — OFFICE VISIT (OUTPATIENT)
Dept: CARDIOLOGY | Facility: CLINIC | Age: 70
End: 2025-03-06
Payer: MEDICARE

## 2025-03-06 VITALS
RESPIRATION RATE: 18 BRPM | HEART RATE: 76 BPM | BODY MASS INDEX: 27.96 KG/M2 | WEIGHT: 211 LBS | HEIGHT: 73 IN | SYSTOLIC BLOOD PRESSURE: 126 MMHG | OXYGEN SATURATION: 97 % | DIASTOLIC BLOOD PRESSURE: 75 MMHG

## 2025-03-06 DIAGNOSIS — I10 ESSENTIAL HYPERTENSION: Primary | ICD-10-CM

## 2025-03-06 PROCEDURE — 99214 OFFICE O/P EST MOD 30 MIN: CPT | Performed by: INTERNAL MEDICINE

## 2025-03-06 PROCEDURE — 3078F DIAST BP <80 MM HG: CPT | Performed by: INTERNAL MEDICINE

## 2025-03-06 PROCEDURE — 3074F SYST BP LT 130 MM HG: CPT | Performed by: INTERNAL MEDICINE

## 2025-03-06 RX ORDER — AMPICILLIN TRIHYDRATE 250 MG
CAPSULE ORAL DAILY
COMMUNITY

## 2025-03-06 RX ORDER — AMLODIPINE BESYLATE 5 MG/1
5 TABLET ORAL DAILY
Qty: 90 TABLET | Refills: 3 | Status: SHIPPED | OUTPATIENT
Start: 2025-03-06

## 2025-03-06 RX ORDER — LOSARTAN POTASSIUM 100 MG/1
100 TABLET ORAL DAILY
Qty: 90 TABLET | Refills: 3 | Status: SHIPPED | OUTPATIENT
Start: 2025-03-06

## 2025-03-06 RX ORDER — VITAMIN B COMPLEX
CAPSULE ORAL DAILY
COMMUNITY

## 2025-03-06 NOTE — PROGRESS NOTES
Cardiology Clinic Note  Addi Rodriguez MD, PhD    Subjective:     Encounter Date:03/06/2025      Patient ID: Caleb Moreno is a 69 y.o. male.    Chief Complaint:  Chief Complaint   Patient presents with    Follow-up       HPI:    I the pleasure to see this 68-year-old in clinic as a new patient with abnormal calcium score.  Blood pressure is well-controlled, has had 15 pounds of intentional weight loss recently.  He is controlled on low-dose statin therapy, ARB HCTZ and amlodipine.  He denies any unstable anginal chest pain.  He has a history of syncope thought secondary to prior dehydration working outside as well as being on antihypertensives including HCTZ.  He had a screening calcium score which revealed left main to 32, LAD 68 circumflex 41,  with total calcium score 504 which is high risk for CV events.  Stress test last year revealed no reversible ischemia, preserved EF    He presented to the ER earlier this month with palpitations and chest pain ultimately finding potassium is low at 2.9 likely resulting from HCTZ.  He does not drink much water he says, he is on stable pharmacotherapy with amlodipine and losartan as well as Crestor given his high calcium score.  He denies any unstable angina or exertional issues.  He has had a history with presyncope again likely secondary to dehydration working outside likely contributory by his diuretic which she would like to avoid.  He previously took this with some lower extremity swelling also in the setting of musculoskeletal issues with knee pain which is likely his primary etiology.  No other issues today     Review of systems otherwise negative x 14 point review of systems except as mentioned above     Historical data copied forward from previous encounters in EMR including the history, exam, and assessment/plan has been reviewed and is unchanged unless noted otherwise.     Cardiac medicines reviewed with risk, benefits, and necessity of each discussed.    "  Risk and benefit of cardiac testing reviewed including death heart attack stroke pain bleeding infection need for vascular /cardiovascular surgery were discussed and the patient      Objective:         Objective  Vitals reviewed below     Physical Exam  Regular rate and rhythm no rubs gallops heave or lift  No clubbing cyanosis or edema  Skin is warm and dry  Normal pulses normal cap refill  No carotid bruits or JVD  Unchanged from prior  Assessment:         Assessment  Independently manage medical conditions today  Secondary prevention CAD  Coronary artery disease  Elevated calcium score high risk  Dyspnea on exertion  Essential hypertension  Sinus bradycardia  Diagnoses and all orders for this visit:     1. Elevated coronary artery calcium score (Primary)  -     Stress Test With Myocardial Perfusion One Day; Future     2. SYLVESTER (dyspnea on exertion)  -     Stress Test With Myocardial Perfusion One Day; Future     Other orders  -     aspirin 81 MG EC tablet; Take 1 tablet by mouth Daily.  Dispense: 90 tablet; Refill: 3  -     rosuvastatin (CRESTOR) 20 MG tablet; Take 1 tablet by mouth Daily.  Dispense: 90 tablet; Refill: 3     Continue high intensity statin Crestor 20 daily, combine aspirin with high risk calcium score, calcium score greater than 500  Diet and exercise brachia guidelines  Treadmill stress with nuclear imaging was negative and unremarkable for any reversible ischemia 2024  Avoid alyson blockers recommended with heart rates in the mid 50s previously  EKG without ischemic findings   Afterload reduction is okay with calcium channel blocker losartan, discontinue HCTZ with hypokalemia and prior dehydration and syncope    No clinical heart failure on exam today     9 months to year follow-up    Addi Rodriguez MD, PhD  Next labs ordered for review for correction of his potassium       Objective:         /75 (BP Location: Left arm, Patient Position: Sitting)   Pulse 76   Resp 18   Ht 185.4 cm (73\")  "  Wt 95.7 kg (211 lb)   SpO2 97%   BMI 27.84 kg/m²     Physical Exam    Assessment:         Diagnoses and all orders for this visit:    1. Essential hypertension (Primary)  -     Comprehensive Metabolic Panel; Future  -     Magnesium; Future    Other orders  -     losartan (Cozaar) 100 MG tablet; Take 1 tablet by mouth Daily.  Dispense: 90 tablet; Refill: 3  -     amLODIPine (NORVASC) 5 MG tablet; Take 1 tablet by mouth Daily.  Dispense: 90 tablet; Refill: 3           Plan:              The pleasure to be involved in this patient's cardiovascular care.  Please call with any questions or concerns  Addi Rodriguez MD, PhD    Most recent EKG as reviewed and interpreted by me:  Procedures     Most recent echo as reviewed and interpreted by me:      Most recent stress test as reviewed and interpreted by me:  Results for orders placed during the hospital encounter of 07/02/24    Stress Test With Myocardial Perfusion One Day    Interpretation Summary    Impressions are consistent with a low risk study.    Left ventricular ejection fraction is normal (Calculated EF = 60%).    There is no prior study available for comparison.    Findings consistent with an equivocal ECG stress test.    Globally diminished stress images without regional reversibility  EF 60%  Normal regional global wall motion with normal size ventricle  No stress ECG changes consistent with ischemia at submaximal stress  Normal blood pressure response    Low risk study although slight confounder with globally reduced stress images      Most recent cardiac catheterization as reviewed interpreted by me:  No results found for this or any previous visit.    The following portions of the patient's history were reviewed and updated as appropriate: allergies, current medications, past family history, past medical history, past social history, past surgical history, and problem list.      ROS:  14 point review of systems negative except as mentioned  above    Current Outpatient Medications:     amLODIPine (NORVASC) 5 MG tablet, Take 1 tablet by mouth Daily., Disp: 90 tablet, Rfl: 3    B Complex Vitamins (vitamin b complex) capsule capsule, Take  by mouth Daily., Disp: , Rfl:     celecoxib (CeleBREX) 100 MG capsule, Take 1 capsule by mouth Daily., Disp: , Rfl:     Coenzyme Q10 (CoQ10) 200 MG capsule, Take  by mouth Daily., Disp: , Rfl:     [employer]S TRIPLE MAGNESIUM COMPLEX PO, Take  by mouth Daily., Disp: , Rfl:     escitalopram (LEXAPRO) 20 MG tablet, Take 1 tablet by mouth Daily., Disp: , Rfl:     ferrous sulfate (FeroSul) 325 (65 FE) MG tablet, Take 1 tablet by mouth Daily., Disp: , Rfl:     rosuvastatin (CRESTOR) 20 MG tablet, Take 1 tablet by mouth Daily., Disp: 90 tablet, Rfl: 3    losartan (Cozaar) 100 MG tablet, Take 1 tablet by mouth Daily., Disp: 90 tablet, Rfl: 3    Problem List:  Patient Active Problem List   Diagnosis    Syncope and collapse    Essential hypertension    Dyslipidemia     Past Medical History:  Past Medical History:   Diagnosis Date    Back pain     Depression     History of meningitis     HLD (hyperlipidemia)     HTN (hypertension)     Swelling of both lower extremities     Syncope     Vertigo      Past Surgical History:  Past Surgical History:   Procedure Laterality Date    KNEE SURGERY       Social History:  Social History     Socioeconomic History    Marital status:    Tobacco Use    Smoking status: Former     Current packs/day: 0.00     Types: Cigarettes     Quit date:      Years since quittin.2    Smokeless tobacco: Current     Types: Snuff   Vaping Use    Vaping status: Never Used   Substance and Sexual Activity    Alcohol use: Not Currently    Drug use: Yes     Frequency: 1.0 times per week     Types: Marijuana    Sexual activity: Defer     Allergies:  Allergies   Allergen Reactions    Clindamycin/Lincomycin Hives    Levaquin [Levofloxacin] Hives     Immunizations:  Immunization History   Administered Date(s)  Administered    COVID-19 (PFIZER) 12YRS+ (COMIRNATY) 10/17/2023, 10/11/2024    COVID-19 (PFIZER) BIVALENT 12+YRS 02/07/2023    COVID-19 (PFIZER) Purple Cap Monovalent 03/03/2021, 03/24/2021, 11/22/2021            In-Office Procedure(s):  No orders to display        ASCVD RIsk Score::  The ASCVD Risk score (Neri DK, et al., 2019) failed to calculate for the following reasons:    Cannot find a previous HDL lab    Cannot find a previous total cholesterol lab    Imaging:    Results for orders placed in visit on 05/09/24    IMAGING SCANNED               Lab Review:   No visits with results within 6 Month(s) from this visit.   Latest known visit with results is:   Lab Requisition on 07/12/2024   Component Date Value    WBC 07/12/2024 9.70     RBC 07/12/2024 3.06 (L)     Hemoglobin 07/12/2024 9.7 (L)     Hematocrit 07/12/2024 30.0 (L)     MCV 07/12/2024 98.0 (H)     MCH 07/12/2024 31.7     MCHC 07/12/2024 32.3     RDW 07/12/2024 14.5     RDW-SD 07/12/2024 51.8     MPV 07/12/2024 8.7     Platelets 07/12/2024 133 (L)     Neutrophil % 07/12/2024 73.4     Lymphocyte % 07/12/2024 15.7 (L)     Monocyte % 07/12/2024 6.9     Eosinophil % 07/12/2024 2.6     Basophil % 07/12/2024 0.6     Immature Grans % 07/12/2024 0.8 (H)     Neutrophils, Absolute 07/12/2024 7.12 (H)     Lymphocytes, Absolute 07/12/2024 1.52     Monocytes, Absolute 07/12/2024 0.67     Eosinophils, Absolute 07/12/2024 0.25     Basophils, Absolute 07/12/2024 0.06     Immature Grans, Absolute 07/12/2024 0.08 (H)     nRBC 07/12/2024 0.0      Recent labs reviewed and interpreted for clinical significance and application            Level of Care:           Addi Rodriguez MD  03/06/25  .

## 2025-03-20 ENCOUNTER — LAB (OUTPATIENT)
Dept: LAB | Facility: HOSPITAL | Age: 70
End: 2025-03-20
Payer: MEDICARE

## 2025-03-20 DIAGNOSIS — I10 ESSENTIAL HYPERTENSION: ICD-10-CM

## 2025-03-20 LAB
ALBUMIN SERPL-MCNC: 4.7 G/DL (ref 3.5–5.2)
ALBUMIN/GLOB SERPL: 2 G/DL
ALP SERPL-CCNC: 76 U/L (ref 39–117)
ALT SERPL W P-5'-P-CCNC: 25 U/L (ref 1–41)
ANION GAP SERPL CALCULATED.3IONS-SCNC: 10 MMOL/L (ref 5–15)
AST SERPL-CCNC: 22 U/L (ref 1–40)
BILIRUB SERPL-MCNC: 0.4 MG/DL (ref 0–1.2)
BUN SERPL-MCNC: 14 MG/DL (ref 8–23)
BUN/CREAT SERPL: 12 (ref 7–25)
CALCIUM SPEC-SCNC: 9.4 MG/DL (ref 8.6–10.5)
CHLORIDE SERPL-SCNC: 103 MMOL/L (ref 98–107)
CO2 SERPL-SCNC: 26 MMOL/L (ref 22–29)
CREAT SERPL-MCNC: 1.17 MG/DL (ref 0.76–1.27)
EGFRCR SERPLBLD CKD-EPI 2021: 68.3 ML/MIN/1.73
GLOBULIN UR ELPH-MCNC: 2.4 GM/DL
GLUCOSE SERPL-MCNC: 106 MG/DL (ref 65–99)
MAGNESIUM SERPL-MCNC: 2.4 MG/DL (ref 1.6–2.4)
POTASSIUM SERPL-SCNC: 3.7 MMOL/L (ref 3.5–5.2)
PROT SERPL-MCNC: 7.1 G/DL (ref 6–8.5)
SODIUM SERPL-SCNC: 139 MMOL/L (ref 136–145)

## 2025-03-20 PROCEDURE — 80053 COMPREHEN METABOLIC PANEL: CPT

## 2025-03-20 PROCEDURE — 36415 COLL VENOUS BLD VENIPUNCTURE: CPT

## 2025-03-20 PROCEDURE — 83735 ASSAY OF MAGNESIUM: CPT

## 2025-03-28 ENCOUNTER — TELEPHONE (OUTPATIENT)
Dept: CARDIOLOGY | Facility: CLINIC | Age: 70
End: 2025-03-28

## 2025-03-28 NOTE — TELEPHONE ENCOUNTER
Caller: KIRK GASTELUM    Relationship to patient: Emergency Contact    Best call back number:[phone]    Patient is needing: WANTS TO DISCUSS LAB WORK

## 2025-05-06 ENCOUNTER — OFFICE (AMBULATORY)
Dept: URBAN - METROPOLITAN AREA PATHOLOGY 19 | Facility: PATHOLOGY | Age: 70
End: 2025-05-06
Payer: MEDICARE

## 2025-05-06 ENCOUNTER — ON CAMPUS - OUTPATIENT (AMBULATORY)
Dept: URBAN - METROPOLITAN AREA HOSPITAL 2 | Facility: HOSPITAL | Age: 70
End: 2025-05-06
Payer: MEDICARE

## 2025-05-06 VITALS
DIASTOLIC BLOOD PRESSURE: 80 MMHG | OXYGEN SATURATION: 99 % | SYSTOLIC BLOOD PRESSURE: 153 MMHG | DIASTOLIC BLOOD PRESSURE: 100 MMHG | SYSTOLIC BLOOD PRESSURE: 123 MMHG | SYSTOLIC BLOOD PRESSURE: 132 MMHG | WEIGHT: 222 LBS | RESPIRATION RATE: 15 BRPM | HEART RATE: 75 BPM | DIASTOLIC BLOOD PRESSURE: 78 MMHG | HEART RATE: 79 BPM | HEIGHT: 73 IN | SYSTOLIC BLOOD PRESSURE: 126 MMHG | OXYGEN SATURATION: 95 % | DIASTOLIC BLOOD PRESSURE: 82 MMHG | SYSTOLIC BLOOD PRESSURE: 110 MMHG | SYSTOLIC BLOOD PRESSURE: 138 MMHG | DIASTOLIC BLOOD PRESSURE: 79 MMHG | DIASTOLIC BLOOD PRESSURE: 81 MMHG | OXYGEN SATURATION: 97 % | SYSTOLIC BLOOD PRESSURE: 136 MMHG | RESPIRATION RATE: 16 BRPM | HEART RATE: 71 BPM | OXYGEN SATURATION: 96 % | TEMPERATURE: 97.6 F | HEART RATE: 70 BPM | HEART RATE: 63 BPM | RESPIRATION RATE: 18 BRPM | SYSTOLIC BLOOD PRESSURE: 144 MMHG | DIASTOLIC BLOOD PRESSURE: 95 MMHG | RESPIRATION RATE: 17 BRPM | OXYGEN SATURATION: 98 % | HEART RATE: 66 BPM | HEART RATE: 65 BPM | HEART RATE: 72 BPM | SYSTOLIC BLOOD PRESSURE: 142 MMHG | DIASTOLIC BLOOD PRESSURE: 84 MMHG

## 2025-05-06 DIAGNOSIS — K63.5 POLYP OF COLON: ICD-10-CM

## 2025-05-06 DIAGNOSIS — Z80.0 FAMILY HISTORY OF MALIGNANT NEOPLASM OF DIGESTIVE ORGANS: ICD-10-CM

## 2025-05-06 DIAGNOSIS — Z12.11 ENCOUNTER FOR SCREENING FOR MALIGNANT NEOPLASM OF COLON: ICD-10-CM

## 2025-05-06 DIAGNOSIS — D12.3 BENIGN NEOPLASM OF TRANSVERSE COLON: ICD-10-CM

## 2025-05-06 DIAGNOSIS — K57.30 DIVERTICULOSIS OF LARGE INTESTINE WITHOUT PERFORATION OR ABS: ICD-10-CM

## 2025-05-06 PROCEDURE — 45385 COLONOSCOPY W/LESION REMOVAL: CPT | Mod: PT | Performed by: INTERNAL MEDICINE

## 2025-05-06 PROCEDURE — 88305 TISSUE EXAM BY PATHOLOGIST: CPT | Mod: 26 | Performed by: PATHOLOGY

## 2025-05-30 ENCOUNTER — TELEPHONE (OUTPATIENT)
Dept: CARDIOLOGY | Facility: CLINIC | Age: 70
End: 2025-05-30

## 2025-05-30 ENCOUNTER — OFFICE VISIT (OUTPATIENT)
Dept: CARDIOLOGY | Facility: CLINIC | Age: 70
End: 2025-05-30
Payer: MEDICARE

## 2025-05-30 VITALS
HEART RATE: 66 BPM | SYSTOLIC BLOOD PRESSURE: 124 MMHG | BODY MASS INDEX: 31.54 KG/M2 | DIASTOLIC BLOOD PRESSURE: 73 MMHG | HEIGHT: 73 IN | WEIGHT: 238 LBS | RESPIRATION RATE: 16 BRPM | OXYGEN SATURATION: 92 %

## 2025-05-30 DIAGNOSIS — R53.83 FATIGUE, UNSPECIFIED TYPE: Primary | ICD-10-CM

## 2025-05-30 DIAGNOSIS — G47.19 EXCESSIVE DAYTIME SLEEPINESS: ICD-10-CM

## 2025-05-30 DIAGNOSIS — I10 ESSENTIAL HYPERTENSION: ICD-10-CM

## 2025-05-30 DIAGNOSIS — R60.0 BILATERAL LEG EDEMA: ICD-10-CM

## 2025-05-30 RX ORDER — TRAMADOL HYDROCHLORIDE 50 MG/1
50 TABLET ORAL EVERY 6 HOURS PRN
COMMUNITY
Start: 2025-05-29

## 2025-05-30 RX ORDER — FERROUS SULFATE 325(65) MG
325 TABLET ORAL DAILY
COMMUNITY
Start: 2025-04-01 | End: 2025-05-30

## 2025-05-30 RX ORDER — FUROSEMIDE 20 MG/1
20 TABLET ORAL AS NEEDED
Qty: 30 TABLET | Refills: 1 | Status: SHIPPED | OUTPATIENT
Start: 2025-05-30

## 2025-05-30 NOTE — PROGRESS NOTES
Date of Office Visit: 2025  Encounter Provider: Georgina Doherty MD  Place of Service: [facility] CARDIOLOGY [place]  Patient Name: Caleb Moreno  :1955  Karl Neumann MD    Chief Complaint   Patient presents with    Follow-up     edema     History of Present Illness--    This is a 69-year-old male with a past medical history of abnormal calcium score, HTN, history of sleep apnea not on CPAP, hx of knee surgery who presents for follow-up visit.  He has been experiencing leg swelling for the past few months and is more noticeable in the mornings.  He says the swelling may have worsened after starting amlodipine a few months ago.  He also takes Celebrex which could be contributing.  He also feels sluggish and tired which is unusual for him as he is typically hyperactive.  He denies having any shortness of breath, dyspnea exertion but does feel more tired.  He said he had a sleep study conducted many years ago and had a diagnosis of sleep apnea however does not wear CPAP machine.  His blood pressure has been well-controlled.  His blood pressure today is 124/73, heart rate 63 bpm he is EKG shows sinus rhythm with nonspecific T wave abnormalities.  At this time I will discontinue amlodipine as this may be contributing to his leg edema.  Will order an echocardiogram to evaluate for LV systolic function, wall motion normalities and valvular disease.  Will give Lasix 20 mg p.o. as needed to take as needed for leg edema.  Will also order a home sleep study as he may benefit from CPAP therapy given excessive daytime sleepiness and fatigue recently.  He currently denies having any current symptoms of chest pain, shortness of breath, dyspnea on exertion, palpitations, lightheadedness, dizziness, syncope, headaches, chills, fevers, or  leg swelling.    Prior history--  3/6/25      I the pleasure to see this 68-year-old in clinic as a new patient with abnormal calcium score.  Blood pressure is  well-controlled, has had 15 pounds of intentional weight loss recently.  He is controlled on low-dose statin therapy, ARB HCTZ and amlodipine.  He denies any unstable anginal chest pain.  He has a history of syncope thought secondary to prior dehydration working outside as well as being on antihypertensives including HCTZ.  He had a screening calcium score which revealed left main to 32, LAD 68 circumflex 41,  with total calcium score 504 which is high risk for CV events.  Stress test last year revealed no reversible ischemia, preserved EF     He presented to the ER earlier this month with palpitations and chest pain ultimately finding potassium is low at 2.9 likely resulting from HCTZ.  He does not drink much water he says, he is on stable pharmacotherapy with amlodipine and losartan as well as Crestor given his high calcium score.  He denies any unstable angina or exertional issues.  He has had a history with presyncope again likely secondary to dehydration working outside likely contributory by his diuretic which she would like to avoid.  He previously took this with some lower extremity swelling also in the setting of musculoskeletal issues with knee pain which is likely his primary etiology.  No other issues today      The ASCVD Risk score (Bedias DK, et al., 2019) failed to calculate for the following reasons:    Cannot find a previous HDL lab    Cannot find a previous total cholesterol lab       Past Medical History:   Diagnosis Date    Back pain     Depression     History of meningitis     HLD (hyperlipidemia)     HTN (hypertension)     Swelling of both lower extremities     Syncope     Vertigo          Past Surgical History:   Procedure Laterality Date    KNEE SURGERY             Current Outpatient Medications:     B Complex Vitamins (vitamin b complex) capsule capsule, Take  by mouth Daily., Disp: , Rfl:     celecoxib (CeleBREX) 100 MG capsule, Take 1 capsule by mouth Daily., Disp: , Rfl:      Coenzyme Q10 (CoQ10) 200 MG capsule, Take  by mouth Daily., Disp: , Rfl:     [employer]S TRIPLE MAGNESIUM COMPLEX PO, Take  by mouth Daily., Disp: , Rfl:     escitalopram (LEXAPRO) 20 MG tablet, Take 1 tablet by mouth Daily., Disp: , Rfl:     ferrous sulfate (FeroSul) 325 (65 FE) MG tablet, Take 1 tablet by mouth Daily., Disp: , Rfl:     losartan (Cozaar) 100 MG tablet, Take 1 tablet by mouth Daily., Disp: 90 tablet, Rfl: 3    rosuvastatin (CRESTOR) 20 MG tablet, Take 1 tablet by mouth Daily., Disp: 90 tablet, Rfl: 3    traMADol (ULTRAM) 50 MG tablet, 1 tablet Every 6 (Six) Hours As Needed., Disp: , Rfl:     furosemide (LASIX) 20 MG tablet, Take 1 tablet by mouth As Needed (prn for leg edema)., Disp: 30 tablet, Rfl: 1      Social History     Socioeconomic History    Marital status:    Tobacco Use    Smoking status: Former     Current packs/day: 0.00     Types: Cigarettes     Quit date:      Years since quittin.4    Smokeless tobacco: Current     Types: Snuff   Vaping Use    Vaping status: Never Used   Substance and Sexual Activity    Alcohol use: Not Currently    Drug use: Yes     Frequency: 1.0 times per week     Types: Marijuana    Sexual activity: Defer         ROS  Negative except for above     Procedures/Imaging results:    Cath: n/a    Stress tests:    24    Interpretation Summary         Impressions are consistent with a low risk study.    Left ventricular ejection fraction is normal (Calculated EF = 60%).    There is no prior study available for comparison.    Findings consistent with an equivocal ECG stress test.     Globally diminished stress images without regional reversibility  EF 60%  Normal regional global wall motion with normal size ventricle  No stress ECG changes consistent with ischemia at submaximal stress  Normal blood pressure response     Low risk study although slight confounder with globally reduced stress images    ECHO: n/a      ECG 12 Lead    Date/Time: 2025 4:48  "PM  Performed by: Georgina Doherty MD    Authorized by: Georgina Doherty MD  Comparison: compared with previous ECG   Similar to previous ECG  Comments: Sinus rhythm with nonspecific T wave abnormality      No orders to display           Objective:    /73 (BP Location: Right arm, Patient Position: Sitting)   Pulse 66 Comment: ekg  Resp 16   Ht 185.4 cm (73\")   Wt 108 kg (238 lb)   SpO2 92%   BMI 31.40 kg/m²         Physical Exam-    Constitutional:       Appearance: Normal and healthy appearance. Well-developed and not in distress.   Eyes:      Conjunctiva/sclera: Conjunctivae normal.      Pupils: Pupils are equal, round, and reactive to light.   HENT:      Head: Normocephalic and atraumatic.      Nose: Nose normal.    Mouth/Throat:      Pharynx: Oropharynx is clear.   Pulmonary:      Effort: Pulmonary effort is normal.      Breath sounds: Normal breath sounds.   Cardiovascular:      PMI at left midclavicular line. Normal rate. Regular rhythm.      Murmurs: There is no murmur.      No click. No rub.   Pulses:     Intact distal pulses.   Edema:  1+ pitting edema bilaterally   Musculoskeletal: Normal range of motion.      Cervical back: Normal range of motion and neck supple. Skin:     General: Skin is warm and dry.   Neurological:      General: No focal deficit present.      Mental Status: Alert, oriented to person, place, and time and oriented to person, place and time. Mental status is at baseline.         Assessment:   Diagnoses and all orders for this visit:    1. Fatigue, unspecified type (Primary)  -     Adult Transthoracic Echo Complete W/ Color, Spectral and Contrast if Necessary Per Protocol; Future  -     TSH; Future    2. Bilateral leg edema  -     Adult Transthoracic Echo Complete W/ Color, Spectral and Contrast if Necessary Per Protocol; Future    3. Essential hypertension  -     Basic Metabolic Panel; Future  -     CBC & Differential; Future    4. Excessive daytime sleepiness  -     Home Sleep " Study; Future    Other orders  -     furosemide (LASIX) 20 MG tablet; Take 1 tablet by mouth As Needed (prn for leg edema).  Dispense: 30 tablet; Refill: 1         Plan:   HTN  Bilateral leg edema  Fatigue  Excessive daytime sleepiness  Hx of knee surgery     Patient has 1+ pitting edema bilaterally otherwise stable and asymptomatic from a cardiovascular standpoint.  Denies any chest pain or shortness of breath.    Will discontinue amlodipine as this may be contributing to his leg edema.  Will have patient monitor blood pressure and report back any consistently elevated numbers.    Lasix 20 mg po PRN as needed for leg edema  Recommended limiting dietary sodium.  Leg elevation and compression stockings as needed.  Will order echocardiogram to evaluate LV function, wall motion normalities and any valvular disease.  Will also order home sleep study test  Will order blood work, CBC, CMP, TSH    Follow up in 8 weeks     MD DEANDRE Nash Dragon/Transcription disclaimer:  Much of this encounter note is an electronic transcription/translation of spoken language to printed text. The electronic translation of spoken language may permit erroneous, or at times, nonsensical words or phrases to be inadvertently transcribed; Although I have reviewed the note for such errors, some may still exist.

## 2025-05-30 NOTE — TELEPHONE ENCOUNTER
Caller: KIRK GASTELUM     Relationship: SPOUSE    Best call back number: [phone]    What is your medical concern? SWELLING IN LEGS    How long has this issue been going on? 6-7 DAYS    Is your provider already aware of this issue? YES    Have you been treated for this issue? YES BUT THE PATIENT'S WIFE WASN'T SURE ON WHICH ON THAT THERE WAS A MEDICATION CHANGE RECENTLY AND THEY WERE TOLD IF THE SWELLING COMES BACK TO CALL ABOUT GETTING ANOTHER MEDICATION PLEASE CALL AND ADVISE.

## 2025-06-13 ENCOUNTER — TELEPHONE (OUTPATIENT)
Dept: CARDIOLOGY | Facility: CLINIC | Age: 70
End: 2025-06-13
Payer: MEDICARE

## 2025-06-13 NOTE — TELEPHONE ENCOUNTER
Caller: JEFF    Relationship:     Best call back number: [phone]      REF: [id number]  PA IS NEEDED FOR SLEEP STUDY    FAX:[phone]

## 2025-06-24 ENCOUNTER — HOSPITAL ENCOUNTER (OUTPATIENT)
Dept: CARDIOLOGY | Facility: HOSPITAL | Age: 70
Discharge: HOME | End: 2025-06-24
Admitting: INTERNAL MEDICINE
Payer: MEDICARE

## 2025-06-24 VITALS
WEIGHT: 235 LBS | DIASTOLIC BLOOD PRESSURE: 69 MMHG | SYSTOLIC BLOOD PRESSURE: 140 MMHG | BODY MASS INDEX: 31.14 KG/M2 | HEIGHT: 73 IN

## 2025-06-24 DIAGNOSIS — R53.83 FATIGUE, UNSPECIFIED TYPE: ICD-10-CM

## 2025-06-24 DIAGNOSIS — R60.0 BILATERAL LEG EDEMA: ICD-10-CM

## 2025-06-24 PROCEDURE — 25010000002 SULFUR HEXAFLUORIDE MICROSPH 60.7-25 MG RECONSTITUTED SUSPENSION: Performed by: INTERNAL MEDICINE

## 2025-06-24 PROCEDURE — 93306 TTE W/DOPPLER COMPLETE: CPT

## 2025-06-24 RX ADMIN — SULFUR HEXAFLUORIDE 2 ML: KIT at 16:42

## 2025-06-25 LAB
AORTIC DIMENSIONLESS INDEX: 0.72 (DI)
AV MEAN PRESS GRAD SYS DOP V1V2: 7 MMHG
AV VMAX SYS DOP: 163 CM/SEC
BH CV ECHO MEAS - AO MAX PG: 10.6 MMHG
BH CV ECHO MEAS - AO V2 VTI: 42.5 CM
BH CV ECHO MEAS - AVA(I,D): 3 CM2
BH CV ECHO MEAS - EDV(CUBED): 132.7 ML
BH CV ECHO MEAS - EDV(MOD-SP4): 180 ML
BH CV ECHO MEAS - EF(MOD-SP4): 58.2 %
BH CV ECHO MEAS - ESV(CUBED): 46.7 ML
BH CV ECHO MEAS - ESV(MOD-SP4): 75.3 ML
BH CV ECHO MEAS - FS: 29.4 %
BH CV ECHO MEAS - IVS/LVPW: 0.86 CM
BH CV ECHO MEAS - IVSD: 0.6 CM
BH CV ECHO MEAS - LA DIMENSION: 4.1 CM
BH CV ECHO MEAS - LAT PEAK E' VEL: 10.9 CM/SEC
BH CV ECHO MEAS - LV DIASTOLIC VOL/BSA (35-75): 78.1 CM2
BH CV ECHO MEAS - LV MASS(C)D: 108.3 GRAMS
BH CV ECHO MEAS - LV MAX PG: 6.3 MMHG
BH CV ECHO MEAS - LV MEAN PG: 3 MMHG
BH CV ECHO MEAS - LV SYSTOLIC VOL/BSA (12-30): 32.7 CM2
BH CV ECHO MEAS - LV V1 MAX: 125 CM/SEC
BH CV ECHO MEAS - LV V1 VTI: 30.7 CM
BH CV ECHO MEAS - LVIDD: 5.1 CM
BH CV ECHO MEAS - LVIDS: 3.6 CM
BH CV ECHO MEAS - LVOT AREA: 4.2 CM2
BH CV ECHO MEAS - LVOT DIAM: 2.3 CM
BH CV ECHO MEAS - LVPWD: 0.7 CM
BH CV ECHO MEAS - MED PEAK E' VEL: 7.4 CM/SEC
BH CV ECHO MEAS - MV A MAX VEL: 90.1 CM/SEC
BH CV ECHO MEAS - MV DEC SLOPE: 212 CM/SEC2
BH CV ECHO MEAS - MV DEC TIME: 0.19 SEC
BH CV ECHO MEAS - MV E MAX VEL: 85.9 CM/SEC
BH CV ECHO MEAS - MV E/A: 0.95
BH CV ECHO MEAS - MV MAX PG: 4.4 MMHG
BH CV ECHO MEAS - MV MEAN PG: 2 MMHG
BH CV ECHO MEAS - MV P1/2T: 143.7 MSEC
BH CV ECHO MEAS - MV V2 VTI: 35.2 CM
BH CV ECHO MEAS - MVA(P1/2T): 1.53 CM2
BH CV ECHO MEAS - MVA(VTI): 3.6 CM2
BH CV ECHO MEAS - PA V2 MAX: 134 CM/SEC
BH CV ECHO MEAS - SV(LVOT): 127.6 ML
BH CV ECHO MEAS - SV(MOD-SP4): 104.7 ML
BH CV ECHO MEAS - SVI(LVOT): 55.3 ML/M2
BH CV ECHO MEAS - SVI(MOD-SP4): 45.4 ML/M2
BH CV ECHO MEAS - TAPSE (>1.6): 2.5 CM
BH CV ECHO MEASUREMENTS AVERAGE E/E' RATIO: 9.39
BH CV XLRA - TDI S': 11.7 CM/SEC
LV EF BIPLANE MOD: 58.2 %
SINUS: 3.4 CM
STJ: 3.1 CM

## 2025-06-25 RX ORDER — ROSUVASTATIN CALCIUM 20 MG/1
20 TABLET, COATED ORAL DAILY
Qty: 90 TABLET | Refills: 3 | Status: SHIPPED | OUTPATIENT
Start: 2025-06-25

## 2025-06-25 NOTE — TELEPHONE ENCOUNTER
Caller: NIRAVKIRK    Relationship: Emergency Contact    Best call back number:     Requested Prescriptions:   Requested Prescriptions     Pending Prescriptions Disp Refills    rosuvastatin (CRESTOR) 20 MG tablet 90 tablet 3     Sig: Take 1 tablet by mouth Daily.        Pharmacy where request should be sent: [facility] [id number] [address] - [phone] PH - [phone] FX     Last office visit with prescribing clinician: 3/6/2025   Last telemedicine visit with prescribing clinician: Visit date not found   Next office visit with prescribing clinician: 1/7/2026     Additional details provided by patient:     Does the patient have less than a 3 day supply:  [] Yes  [x] No    Would you like a call back once the refill request has been completed: [] Yes [x] No    If the office needs to give you a call back, can they leave a voicemail: [] Yes [x] No    Shirley Meeks   06/25/25 09:10 EDT

## 2025-06-27 ENCOUNTER — RESULTS FOLLOW-UP (OUTPATIENT)
Dept: CARDIOLOGY | Facility: CLINIC | Age: 70
End: 2025-06-27
Payer: MEDICARE

## 2025-07-01 ENCOUNTER — TELEPHONE (OUTPATIENT)
Dept: CARDIOLOGY | Facility: CLINIC | Age: 70
End: 2025-07-01
Payer: MEDICARE

## 2025-07-01 NOTE — TELEPHONE ENCOUNTER
I spoke with Caleb letting him know that the sleep study is being denied and Dr. Doherty is holding off on the sleep study for now.  I asked him to keep the appointment with Dr. Doherty on 8/1/25.  He verbalized his understanding. VELMA Gomez

## 2025-07-01 NOTE — TELEPHONE ENCOUNTER
You saw patient 5/30/25, ordered sleep study.  It is being denied.  Pt canceled his appointment with Dr. Rodriguez today, scheduled with Yesika Hartman on 8/1/25.  Do you want patient to have the sleep study?  Do you want to do a Peer to Peer? VELMA Gomez     insurance denied request for HST. Denial reason pasted below. If your provider would like to request a P2P, please call [phone] Opt 1, use order # [id number]. DENIAL REASON: Your doctor told us that you stop breathing for short periods of time while you are sleeping. Your doctor ordered a sleep test to recheck your condition. This test is used to see if treatment is still needed if you have had a large weight loss since your last sleep test. We reviewed the notes we have. The notes do not show that you lost a lot of weight. Based on the information we have, this test is not medically necessary. We used Medicare Local Coverage Determination [id number] Polysomnography and Other Sleep Studies to make this decision. Signed direct referral given to HIM for scanning. Just follow up on this I see that the patient had appointment with Dr. Rodriguez today and cancelled. Just wondering if this is still needed?

## 2025-07-29 ENCOUNTER — OFFICE VISIT (OUTPATIENT)
Dept: CARDIOLOGY | Facility: CLINIC | Age: 70
End: 2025-07-29
Payer: MEDICARE

## 2025-07-29 VITALS
OXYGEN SATURATION: 96 % | HEART RATE: 56 BPM | RESPIRATION RATE: 16 BRPM | DIASTOLIC BLOOD PRESSURE: 74 MMHG | SYSTOLIC BLOOD PRESSURE: 134 MMHG | WEIGHT: 231 LBS | BODY MASS INDEX: 30.62 KG/M2 | HEIGHT: 73 IN

## 2025-07-29 DIAGNOSIS — E78.5 DYSLIPIDEMIA: ICD-10-CM

## 2025-07-29 DIAGNOSIS — R55 SYNCOPE AND COLLAPSE: ICD-10-CM

## 2025-07-29 DIAGNOSIS — I10 ESSENTIAL HYPERTENSION: Primary | ICD-10-CM

## 2025-07-29 DIAGNOSIS — G47.33 OSA (OBSTRUCTIVE SLEEP APNEA): ICD-10-CM

## 2025-07-29 PROCEDURE — 99214 OFFICE O/P EST MOD 30 MIN: CPT | Performed by: NURSE PRACTITIONER

## 2025-07-29 PROCEDURE — 3078F DIAST BP <80 MM HG: CPT | Performed by: NURSE PRACTITIONER

## 2025-07-29 PROCEDURE — 93000 ELECTROCARDIOGRAM COMPLETE: CPT | Performed by: NURSE PRACTITIONER

## 2025-07-29 PROCEDURE — 3075F SYST BP GE 130 - 139MM HG: CPT | Performed by: NURSE PRACTITIONER

## 2025-07-29 PROCEDURE — 1159F MED LIST DOCD IN RCRD: CPT | Performed by: NURSE PRACTITIONER

## 2025-07-29 PROCEDURE — 1160F RVW MEDS BY RX/DR IN RCRD: CPT | Performed by: NURSE PRACTITIONER

## 2025-07-29 RX ORDER — ROSUVASTATIN CALCIUM 10 MG/1
10 TABLET, COATED ORAL DAILY
Qty: 90 TABLET | Refills: 3 | Status: SHIPPED | OUTPATIENT
Start: 2025-07-29

## 2025-07-29 RX ORDER — CHOLECALCIFEROL (VITAMIN D3) 25 MCG
1000 TABLET ORAL DAILY
COMMUNITY

## 2025-07-29 NOTE — PROGRESS NOTES
Cardiology Office Follow Up Visit      Primary Care Provider:  Karl Neumann MD    Reason for f/u:     Hypertension  Dyslipidemia      Subjective     CC:  Recent episode of syncope History of Present Illness       Caleb Moreno is a 70 y.o. male.  Patient is a 70-year-old male who is routinely followed by Dr. Rodriguez.    He has a history of previous syncope.    He is treated for hypertension, dyslipidemia.    In July 2024 patient had a stress Myoview that showed no evidence of reversible ischemia.  In May of this year patient was evaluated in the office he was complaining of some lower extremity edema and fatigue.  He was started on some diuretics as needed.  It was recommended to limit dietary sodium, use compression socks and elevate legs.  Home sleep study was ordered as well as blood test and echocardiogram.    Patient underwent echocardiogram on June 25, 2025.  Ejection fraction was 58%.  Diastolic function was normal.  There was trace MR.    proBNP was completed and within normal limits TSH 0.58 BUN and creatinine 16 and 1.57.    Patient had previous abnormal sleep study with AHI being 15 with desaturation to 87% overnight this was completed in 2005    Patient was seen in the emergency room on July 15, 2025 due to syncopal episode.  His BUN and creatinine was elevated.  He was found to be orthostatic and bradycardic.    Patient reports when he went to the ER he had been at work and began to feel hot all over he stood up and then fell to the ground he did injure himself by hitting his hand on a bookcase requiring sutures.  He denies any loss of bowel or bladder function no associated chest pain or feelings of shortness of breath.  A few days later he had a second episode without full syncope began to feel hot and lightheaded and sat down and the event was abated.    He had been taking Lasix at that time daily but has since stopped.  He was mildly dehydrated and was given a liter of normal  saline.    Patient denies any chest pain or dyspnea no palpitations or feelings of his heart racing but has had recurrent syncope.  The patient had a 48-hour Holter monitor placed which showedEpisodes of sinus bradycardia down to 41 unfortunately I do not have the strips available as this was done at another facility.          ASSESSMENT/PLAN:      Diagnoses and all orders for this visit:    1. Essential hypertension (Primary)    2. Dyslipidemia            MEDICAL DECISION MAKING:    Like to proceed with a 14-day M cot to further quantitate his bradycardia and the timing of these events.  His EKG today shows sinus bradycardia with heart rate of 56.  I will place a 14-day M cot monitor blood pressure today is stable.  Patient has now stopped diuretics.  Would recommend repeating a BMP to ensure that his creatinine has returned to baseline.    If he continues to have symptoms or has continued episodes of bradycardia may consider loop recorder for longer-term monitoring but will start with 14-day M cot.  If he has any new or worsening problems of asked him to contact the office sooner.  Plans for follow-up will be made after M cot is reviewed    Recently had a home sleep study denied by his insurance company.  He had previous abnormal sleep study in 2005 he continues to snore and report daytime somnolence.  I have placed a referral to sleep medicine for further evaluation of sleep apnea      Past Medical History:   Diagnosis Date    Back pain     Depression     History of meningitis     HLD (hyperlipidemia)     HTN (hypertension)     Swelling of both lower extremities     Syncope     Vertigo        Past Surgical History:   Procedure Laterality Date    KNEE SURGERY           Current Outpatient Medications:     B Complex Vitamins (vitamin b complex) capsule capsule, Take  by mouth Daily., Disp: , Rfl:     celecoxib (CeleBREX) 100 MG capsule, Take 1 capsule by mouth Daily., Disp: , Rfl:     Coenzyme Q10 (CoQ10) 200 MG  "capsule, Take  by mouth Daily., Disp: , Rfl:     [employer] TRIPLE MAGNESIUM COMPLEX PO, Take  by mouth Daily., Disp: , Rfl:     escitalopram (LEXAPRO) 20 MG tablet, Take 1 tablet by mouth Daily., Disp: , Rfl:     ferrous sulfate (FeroSul) 325 (65 FE) MG tablet, Take 1 tablet by mouth Daily., Disp: , Rfl:     furosemide (LASIX) 20 MG tablet, Take 1 tablet by mouth As Needed (prn for leg edema)., Disp: 30 tablet, Rfl: 1    losartan (Cozaar) 100 MG tablet, Take 1 tablet by mouth Daily., Disp: 90 tablet, Rfl: 3    rosuvastatin (CRESTOR) 20 MG tablet, Take 1 tablet by mouth Daily., Disp: 90 tablet, Rfl: 3    traMADol (ULTRAM) 50 MG tablet, 1 tablet Every 6 (Six) Hours As Needed., Disp: , Rfl:     Social History     Socioeconomic History    Marital status:    Tobacco Use    Smoking status: Former     Current packs/day: 0.00     Types: Cigarettes     Quit date:      Years since quittin.5    Smokeless tobacco: Current     Types: Snuff    Tobacco comments:     Patient advised to stop snuff   Vaping Use    Vaping status: Never Used   Substance and Sexual Activity    Alcohol use: Not Currently    Drug use: Yes     Frequency: 1.0 times per week     Types: Marijuana    Sexual activity: Defer       Family History   Problem Relation Age of Onset    Cancer Mother     Heart disease Father     Hypertension Brother        The following portions of the patient's history were reviewed and updated as appropriate: allergies, current medications, past family history, past medical history, past social history, past surgical history and problem list.    Review of Systems   Constitutional: Positive for malaise/fatigue.   Cardiovascular:  Positive for near-syncope and syncope.       Pertinent items are noted in HPI, all other systems reviewed and negative    Resp 16   Ht 185 cm (72.84\")   BMI 31.15 kg/m² .  Objective     Vitals reviewed.   Constitutional:       General: Not in acute distress.     Appearance: Normal appearance. " Well-developed.   Eyes:      Pupils: Pupils are equal, round, and reactive to light.   HENT:      Head: Normocephalic and atraumatic.   Neck:      Vascular: No JVD.   Pulmonary:      Effort: Pulmonary effort is normal.      Breath sounds: Normal breath sounds.   Cardiovascular:      Normal rate. Regular rhythm.   Edema:     Peripheral edema absent.   Abdominal:      General: There is no distension.      Palpations: Abdomen is soft.      Tenderness: There is no abdominal tenderness.   Musculoskeletal: Normal range of motion.      Cervical back: Normal range of motion and neck supple. Skin:     General: Skin is warm and dry.   Neurological:      Mental Status: Alert and oriented to person, place, and time.             ECG 12 Lead    Date/Time: 7/29/2025 11:32 AM  Performed by: Yesika Mayer APRN    Authorized by: Yesika Mayer APRN  Comparison: compared with previous ECG   Similar to previous ECG  Rhythm: sinus bradycardia  Rate: bradycardic  BPM: 56  Conduction: conduction normal  ST Segments: ST segments normal  T Waves: T waves normal  QRS axis: normal  Other: no other findings          EKG ordered by and reviewed by me in office